# Patient Record
Sex: MALE
[De-identification: names, ages, dates, MRNs, and addresses within clinical notes are randomized per-mention and may not be internally consistent; named-entity substitution may affect disease eponyms.]

---

## 2019-12-13 ENCOUNTER — HOSPITAL ENCOUNTER (EMERGENCY)
Dept: HOSPITAL 25 - UCEAST | Age: 34
Discharge: HOME | End: 2019-12-13
Payer: SELF-PAY

## 2019-12-13 DIAGNOSIS — L08.9: Primary | ICD-10-CM

## 2019-12-13 LAB
BASOPHILS # BLD AUTO: 0 10^3/UL (ref 0–0.2)
EOSINOPHIL # BLD AUTO: 0.2 10^3/UL (ref 0–0.6)
HCT VFR BLD AUTO: 45 % (ref 42–52)
HGB BLD-MCNC: 14.9 G/DL (ref 14–18)
LYMPHOCYTES # BLD AUTO: 2.9 10^3/UL (ref 1–4.8)
MCH RBC QN AUTO: 28 PG (ref 27–31)
MCHC RBC AUTO-ENTMCNC: 33 G/DL (ref 31–36)
MCV RBC AUTO: 84 FL (ref 80–94)
MONOCYTES # BLD AUTO: 1.3 10^3/UL (ref 0–0.8)
NEUTROPHILS # BLD AUTO: 6.5 10^3/UL (ref 1.5–7.7)
NRBC # BLD AUTO: 0 10^3/UL
NRBC BLD QL AUTO: 0
PLATELET # BLD AUTO: 295 10^3/UL (ref 150–450)
RBC # BLD AUTO: 5.39 10^6 /UL (ref 4.18–5.48)
URATE SERPL-MCNC: 7.4 MG/DL (ref 4.4–7.6)
WBC # BLD AUTO: 10.9 10^3/UL (ref 3.5–10.8)

## 2019-12-13 PROCEDURE — 85025 COMPLETE CBC W/AUTO DIFF WBC: CPT

## 2019-12-13 PROCEDURE — 36415 COLL VENOUS BLD VENIPUNCTURE: CPT

## 2019-12-13 PROCEDURE — 99202 OFFICE O/P NEW SF 15 MIN: CPT

## 2019-12-13 PROCEDURE — G0463 HOSPITAL OUTPT CLINIC VISIT: HCPCS

## 2019-12-13 PROCEDURE — 86140 C-REACTIVE PROTEIN: CPT

## 2019-12-13 PROCEDURE — 84550 ASSAY OF BLOOD/URIC ACID: CPT

## 2019-12-13 NOTE — UC
Lower Extremity/Ankle HPI





- HPI Summary


HPI Summary: 





He has had pain in his left great toe for several days.  It sounds like he had 

an ingrown toenail on the medial aspect of his left great toe that was removed.

  That area is not painful now that he has exquisite tenderness and pain to 

movement of his MPJ of the great toe.  His had no fever or chills





- History of Current Complaint


Chief Complaint: UCLowerExtremity


Stated Complaint: FOOT PAIN


Time Seen by Provider: 12/13/19 07:48


Hx Obtained From: Patient


Onset/Duration: Gradual Onset, Lasting Days


Severity Initially: Moderate


Severity Currently: Severe


Pain Intensity: 9


Aggravating Factor(s): Standing, Ambulation


Alleviating Factor(s): Nothing


Able to Bear Weight: Yes





- Allergies/Home Medications


Allergies/Adverse Reactions: 


 Allergies











Allergy/AdvReac Type Severity Reaction Status Date / Time


 


No Known Allergies Allergy   Verified 12/13/19 07:47











Home Medications: 


 Home Medications





NK [No Home Medications Reported]  12/13/19 [History Confirmed 12/13/19]











PMH/Surg Hx/FS Hx/Imm Hx


Previously Healthy: Yes





- Surgical History


Surgical History: None





- Social History


Alcohol Use: Rare


Substance Use Type: None


Smoking Status (MU): Never Smoked Tobacco





Review of Systems


All Other Systems Reviewed And Are Negative: Yes


Constitutional: Positive: Negative


Musculoskeletal: Positive: Arthralgia





Physical Exam





- Summary


Physical Exam Summary: 





Is nontoxic in appearance with stable vitals


Triage Information Reviewed: Yes


Appearance: Well-Appearing, No Pain Distress


Vital Signs: 


 Initial Vital Signs











Temp  98 F   12/13/19 07:42


 


Pulse  97   12/13/19 07:42


 


Resp  18   12/13/19 07:42


 


BP  149/92   12/13/19 07:42


 


Pulse Ox  100   12/13/19 07:42











Vital Signs Reviewed: Yes


Musculoskeletal Exam: Other - He is mildly erythematous and swollen over his 

MPJ.  It is exquisitely tender to any range of motion.  There is no distal 

passive range of motion tenderness.


Neurological Exam: Normal





Diagnostics





- Radiology


  ** left foot


Radiology Interpretation Completed By: Radiologist


Summary of Radiographic Findings: No acute process





Lower Extremity Course/Dx





- Course


Course Of Treatment: 





Clinically this looks like gout.  However the fact that he may have had a 

paronychia a few days ago gives a concern for infection in the joint.  Labs 

have been sent, x-rays negative and I am going to start him on indomethacin and 

Keflex.  He is from out of town and here for another week.  I've given him care 

connections number.





- Differential Dx/Diagnosis


Provider Diagnosis: 


 Infection








Discharge ED





- Sign-Out/Discharge


Documenting (check all that apply): Patient Departure


All imaging exams completed and their final reports reviewed: Yes





- Discharge Plan


Condition: Stable


Disposition: HOME


Patient Education Materials:  Gout (ED), Wound Infection (ED)


Referrals: 


No Primary Care Phys,NOPCP [Primary Care Provider] - 


[facility] [Outside]


Additional Instructions: 


Please go to the emergency department if your worsening in spite of treatment. 

[facility] is available before if you need a follow-up appointment before 

returning home.  Otherwise follow-up with your PCP when you get back.





- Billing Disposition and Condition


Condition: STABLE


Disposition: Home

## 2019-12-14 NOTE — UC
- Progress Note


Progress Note: 


PROGRESS NOTE:


LAB RESULTS:  .  Patient with a toe infection with the following abnormal lab 

results: WBC 10.9; monocytes 1.3; CRP 27.65.  Normal uric acid.


MDM:  there was a concern for gout as well as cellulitis.  The patient was 

started on both indomethacin and Keflex.  These laboratory values with the 

exception of the CRP are abnormal, but minimally so.  The patient is from out 

of town.  We will call the patient to check on his condition.


Antonio Cerrato MD











Course/Dx





- Diagnoses


Provider Diagnoses: 


 Infection








Discharge ED





- Sign-Out/Discharge


Documenting (check all that apply): Post-Discharge Follow Up


All imaging exams completed and their final reports reviewed: Yes





- Discharge Plan


Condition: Stable


Disposition: HOME


Prescriptions: 


Cephalexin CAP* [Keflex CAP*] 500 mg PO QID #30 cap


Indomethacin CAP* [Indocin CAP*] 25 mg PO TID PRN #20 cap


 PRN Reason: Pain - Moderate


Patient Education Materials:  Wound Infection (ED), Gout (ED)


Referrals: 


[facility] [Outside]


No Primary Care Phys,NOPCP [Primary Care Provider] - 


Additional Instructions: 


Please go to the emergency department if your worsening in spite of treatment. 

[facility] is available before if you need a follow-up appointment before 

returning home.  Otherwise follow-up with your PCP when you get back.





- Billing Disposition and Condition


Condition: STABLE


Disposition: Home

## 2021-01-19 ENCOUNTER — LAB VISIT (OUTPATIENT)
Dept: PRIMARY CARE CLINIC | Facility: OTHER | Age: 36
End: 2021-01-19
Attending: INTERNAL MEDICINE
Payer: COMMERCIAL

## 2021-01-19 DIAGNOSIS — Z20.822 ENCOUNTER FOR LABORATORY TESTING FOR COVID-19 VIRUS: ICD-10-CM

## 2021-01-19 PROCEDURE — U0003 INFECTIOUS AGENT DETECTION BY NUCLEIC ACID (DNA OR RNA); SEVERE ACUTE RESPIRATORY SYNDROME CORONAVIRUS 2 (SARS-COV-2) (CORONAVIRUS DISEASE [COVID-19]), AMPLIFIED PROBE TECHNIQUE, MAKING USE OF HIGH THROUGHPUT TECHNOLOGIES AS DESCRIBED BY CMS-2020-01-R: HCPCS

## 2021-01-20 LAB — SARS-COV-2 RNA RESP QL NAA+PROBE: NOT DETECTED

## 2023-07-13 ENCOUNTER — TELEPHONE (OUTPATIENT)
Dept: PODIATRY | Facility: CLINIC | Age: 38
End: 2023-07-13
Payer: COMMERCIAL

## 2023-07-13 NOTE — TELEPHONE ENCOUNTER
I called patient no available appointment today or tomorrow advise patient to go to urgent care or ED swelling in left foot big toe.